# Patient Record
Sex: MALE | Race: WHITE | NOT HISPANIC OR LATINO | URBAN - METROPOLITAN AREA
[De-identification: names, ages, dates, MRNs, and addresses within clinical notes are randomized per-mention and may not be internally consistent; named-entity substitution may affect disease eponyms.]

---

## 2021-02-15 PROBLEM — 13644009 HYPERCHOLESTEROLEMIA: Status: ACTIVE | Noted: 2021-02-15

## 2021-02-15 PROBLEM — 432504007 CEREBRAL INFARCTION: Status: ACTIVE | Noted: 2021-02-15

## 2021-02-15 PROBLEM — 38341003 HYPERTENSION: Status: ACTIVE | Noted: 2021-02-15

## 2021-02-15 PROBLEM — 53741008 CORONARY ARTERY DISEASE: Status: ACTIVE | Noted: 2021-02-15

## 2021-02-15 PROBLEM — 313435000 TYPE I DIABETES MELLITUS WITHOUT COMPLICATION: Status: ACTIVE | Noted: 2021-02-15

## 2021-03-17 ENCOUNTER — OFFICE VISIT (OUTPATIENT)
Dept: URBAN - METROPOLITAN AREA CLINIC 46 | Facility: CLINIC | Age: 66
End: 2021-03-17

## 2021-03-19 ENCOUNTER — OFFICE VISIT (OUTPATIENT)
Dept: URBAN - METROPOLITAN AREA CLINIC 13 | Facility: CLINIC | Age: 66
End: 2021-03-19

## 2021-04-07 ENCOUNTER — OFFICE VISIT (OUTPATIENT)
Dept: URBAN - METROPOLITAN AREA SURGERY CENTER 28 | Facility: SURGERY CENTER | Age: 66
End: 2021-04-07

## 2021-04-07 PROBLEM — 432504007 CEREBRAL INFARCTION: Status: ACTIVE | Noted: 2021-04-07

## 2021-04-07 LAB — PDFREPORT1: (no result)

## 2021-04-14 ENCOUNTER — OFFICE VISIT (OUTPATIENT)
Dept: URBAN - METROPOLITAN AREA CLINIC 13 | Facility: CLINIC | Age: 66
End: 2021-04-14

## 2021-05-20 ENCOUNTER — OFFICE VISIT (OUTPATIENT)
Dept: URBAN - METROPOLITAN AREA CLINIC 46 | Facility: CLINIC | Age: 66
End: 2021-05-20

## 2021-08-23 ENCOUNTER — OFFICE VISIT (OUTPATIENT)
Dept: URBAN - METROPOLITAN AREA CLINIC 46 | Facility: CLINIC | Age: 66
End: 2021-08-23

## 2021-08-23 PROBLEM — 70153002 HEMORRHOIDS: Status: ACTIVE | Noted: 2021-08-23

## 2021-08-23 PROBLEM — 428283002 HISTORY OF POLYP OF COLON: Status: ACTIVE | Noted: 2021-08-23

## 2021-08-23 PROBLEM — 84089009 HIATAL HERNIA: Status: ACTIVE | Noted: 2021-08-23

## 2021-08-28 ENCOUNTER — TELEPHONE ENCOUNTER (OUTPATIENT)
Dept: URBAN - METROPOLITAN AREA CLINIC 13 | Facility: CLINIC | Age: 66
End: 2021-08-28

## 2021-08-28 RX ORDER — LISINOPRIL 5 MG/1
TABLET ORAL
OUTPATIENT
End: 2021-04-07

## 2021-08-29 ENCOUNTER — TELEPHONE ENCOUNTER (OUTPATIENT)
Dept: URBAN - METROPOLITAN AREA CLINIC 13 | Facility: CLINIC | Age: 66
End: 2021-08-29

## 2021-08-29 RX ORDER — CARVEDILOL 6.25 MG/1
TABLET, FILM COATED ORAL
Status: ACTIVE | COMMUNITY

## 2021-08-29 RX ORDER — UBIDECARENONE/VIT E ACET 100MG-5
CAPSULE ORAL
Status: ACTIVE | COMMUNITY

## 2021-08-29 RX ORDER — FERROUS SULFATE 324(65)MG
TABLET, DELAYED RELEASE (ENTERIC COATED) ORAL
Status: ACTIVE | COMMUNITY

## 2021-08-29 RX ORDER — NITROGLYCERIN 0.4 MG/1
TABLET SUBLINGUAL
Status: ACTIVE | COMMUNITY

## 2021-08-29 RX ORDER — PANTOPRAZOLE SODIUM 40 MG/1
TABLET, DELAYED RELEASE ORAL
Status: ACTIVE | COMMUNITY
Start: 2021-03-17

## 2021-08-29 RX ORDER — FLUTICASONE PROPIONATE 50 UG/1
SPRAY, METERED NASAL
Status: ACTIVE | COMMUNITY

## 2021-08-29 RX ORDER — LATANOPROST/PF 0.005 %
DROPS OPHTHALMIC (EYE)
Status: ACTIVE | COMMUNITY

## 2021-08-29 RX ORDER — ASPIRIN 81 MG/1
TABLET ORAL
Status: ACTIVE | COMMUNITY

## 2021-08-29 RX ORDER — TICAGRELOR 90 MG/1
TABLET ORAL
Status: ACTIVE | COMMUNITY

## 2021-08-29 RX ORDER — TAMSULOSIN HYDROCHLORIDE 0.4 MG/1
CAPSULE ORAL
Status: ACTIVE | COMMUNITY

## 2021-08-29 RX ORDER — FLUOXETINE HYDROCHLORIDE 40 MG/1
CAPSULE ORAL
Status: ACTIVE | COMMUNITY

## 2021-08-29 RX ORDER — ATORVASTATIN CALCIUM 80 MG/1
TABLET ORAL
Status: ACTIVE | COMMUNITY

## 2021-08-29 RX ORDER — ELECTROLYTES/DEXTROSE
SOLUTION, ORAL ORAL
Status: ACTIVE | COMMUNITY

## 2021-08-29 RX ORDER — TIMOLOL MALEATE 5 MG/ML
SOLUTION/ DROPS OPHTHALMIC
Status: ACTIVE | COMMUNITY

## 2021-09-13 ENCOUNTER — ERX REFILL RESPONSE (OUTPATIENT)
Dept: URBAN - METROPOLITAN AREA CLINIC 44 | Facility: CLINIC | Age: 66
End: 2021-09-13

## 2021-09-13 RX ORDER — PANTOPRAZOLE SODIUM 40 MG/1
TAKE ONE TABLET BY MOUTH DAILY 30 MINUTES BEFORE BREAKFASTQ TABLET, DELAYED RELEASE ORAL
Qty: 30 TABLET | Refills: 3 | OUTPATIENT

## 2021-09-13 RX ORDER — PANTOPRAZOLE SODIUM 40 MG/1
TABLET, DELAYED RELEASE ORAL
OUTPATIENT

## 2021-09-16 ENCOUNTER — OFFICE VISIT (OUTPATIENT)
Dept: URBAN - METROPOLITAN AREA CLINIC 46 | Facility: CLINIC | Age: 66
End: 2021-09-16
Payer: MEDICARE

## 2021-09-16 VITALS
BODY MASS INDEX: 27.5 KG/M2 | TEMPERATURE: 98.2 F | SYSTOLIC BLOOD PRESSURE: 122 MMHG | DIASTOLIC BLOOD PRESSURE: 67 MMHG | WEIGHT: 203 LBS | HEART RATE: 64 BPM | HEIGHT: 72 IN

## 2021-09-16 DIAGNOSIS — K21.9 GERD WITHOUT ESOPHAGITIS: ICD-10-CM

## 2021-09-16 DIAGNOSIS — R05: ICD-10-CM

## 2021-09-16 PROCEDURE — 99203 OFFICE O/P NEW LOW 30 MIN: CPT | Performed by: INTERNAL MEDICINE

## 2021-09-16 RX ORDER — UBIDECARENONE/VIT E ACET 100MG-5
1 CAPSULE CAPSULE ORAL ONCE A DAY
Status: ACTIVE | COMMUNITY

## 2021-09-16 RX ORDER — LATANOPROST/PF 0.005 %
1 DROP INTO AFFECTED EYE IN THE EVENING DROPS OPHTHALMIC (EYE) ONCE A DAY
Status: ACTIVE | COMMUNITY

## 2021-09-16 RX ORDER — ELECTROLYTES/DEXTROSE
1 TABLET SOLUTION, ORAL ORAL ONCE A DAY
Status: ACTIVE | COMMUNITY

## 2021-09-16 RX ORDER — TICAGRELOR 90 MG/1
1 TABLET TABLET ORAL TWICE A DAY
Status: ACTIVE | COMMUNITY

## 2021-09-16 RX ORDER — ASPIRIN 81 MG/1
1 TABLET TABLET ORAL ONCE A DAY
Status: ACTIVE | COMMUNITY

## 2021-09-16 RX ORDER — TIMOLOL MALEATE 5 MG/ML
1 DROP INTO AFFECTED EYE SOLUTION/ DROPS OPHTHALMIC ONCE A DAY
Status: ACTIVE | COMMUNITY

## 2021-09-16 RX ORDER — ATORVASTATIN CALCIUM 80 MG/1
1 TABLET TABLET ORAL ONCE A DAY
Status: ACTIVE | COMMUNITY

## 2021-09-16 RX ORDER — UMECLIDINIUM BROMIDE AND VILANTEROL TRIFENATATE 62.5; 25 UG/1; UG/1
POWDER RESPIRATORY (INHALATION)
Qty: 60 | Status: ACTIVE | COMMUNITY

## 2021-09-16 RX ORDER — ALBUTEROL SULFATE 90 UG/1
AEROSOL, METERED RESPIRATORY (INHALATION)
Qty: 6.7 | Status: ACTIVE | COMMUNITY

## 2021-09-16 RX ORDER — PANTOPRAZOLE SODIUM 40 MG/1
1 TABLET TABLET, DELAYED RELEASE ORAL ONCE A DAY
Refills: 3 | Status: ACTIVE | COMMUNITY

## 2021-09-16 RX ORDER — TAMSULOSIN HYDROCHLORIDE 0.4 MG/1
1 CAPSULE CAPSULE ORAL ONCE A DAY
Status: ACTIVE | COMMUNITY

## 2021-09-16 RX ORDER — PANTOPRAZOLE SODIUM 40 MG/1
1 TABLET TABLET, DELAYED RELEASE ORAL ONCE A DAY
OUTPATIENT

## 2021-09-16 RX ORDER — PANTOPRAZOLE SODIUM 40 MG/1
1 TABLET TABLET, DELAYED RELEASE ORAL TWICE DAILY
Qty: 60 | Refills: 6 | OUTPATIENT
Start: 2021-09-16

## 2021-09-16 RX ORDER — NITROGLYCERIN 0.4 MG/1
AS DIRECTED TABLET SUBLINGUAL
Status: ACTIVE | COMMUNITY

## 2021-09-16 RX ORDER — CARVEDILOL 6.25 MG/1
1 TABLET WITH FOOD TABLET, FILM COATED ORAL TWICE A DAY
Status: ACTIVE | COMMUNITY

## 2021-09-16 RX ORDER — FLUTICASONE PROPIONATE 50 UG/1
1 SPRAY IN EACH NOSTRIL SPRAY, METERED NASAL ONCE A DAY
Status: ACTIVE | COMMUNITY

## 2021-09-16 RX ORDER — FLUOXETINE HYDROCHLORIDE 40 MG/1
1 CAPSULE CAPSULE ORAL ONCE A DAY
Status: ACTIVE | COMMUNITY

## 2021-09-16 RX ORDER — FERROUS SULFATE 324(65)MG
1 TABLET TABLET, DELAYED RELEASE (ENTERIC COATED) ORAL ONCE A DAY
Status: ACTIVE | COMMUNITY

## 2021-09-16 NOTE — HPI-TODAY'S VISIT:
Sick-year-old male is here for follow-up evaluation of reflux and chronic cough.  He was seen by pulmonary and placed on an inhaler.  Overall the patient states he is 25% better.  Currently he has no abdominal bloating dyspepsia or symptoms of peripheral neuropathy.  Patient currently is taking pantoprazole 40 mg daily.  Patient reports no further dysphagia after his EGD and dilation April 2021.

## 2021-12-16 ENCOUNTER — LAB OUTSIDE AN ENCOUNTER (OUTPATIENT)
Dept: URBAN - METROPOLITAN AREA CLINIC 46 | Facility: CLINIC | Age: 66
End: 2021-12-16

## 2021-12-16 ENCOUNTER — OFFICE VISIT (OUTPATIENT)
Dept: URBAN - METROPOLITAN AREA CLINIC 46 | Facility: CLINIC | Age: 66
End: 2021-12-16
Payer: MEDICARE

## 2021-12-16 VITALS
BODY MASS INDEX: 27.5 KG/M2 | HEIGHT: 72 IN | TEMPERATURE: 98.7 F | DIASTOLIC BLOOD PRESSURE: 72 MMHG | SYSTOLIC BLOOD PRESSURE: 142 MMHG | HEART RATE: 64 BPM | WEIGHT: 203 LBS

## 2021-12-16 DIAGNOSIS — K21.9 GERD WITHOUT ESOPHAGITIS: ICD-10-CM

## 2021-12-16 DIAGNOSIS — R05.8 OTHER SPECIFIED COUGH: ICD-10-CM

## 2021-12-16 PROCEDURE — 99214 OFFICE O/P EST MOD 30 MIN: CPT | Performed by: INTERNAL MEDICINE

## 2021-12-16 RX ORDER — TICAGRELOR 90 MG/1
1 TABLET TABLET ORAL TWICE A DAY
Status: ACTIVE | COMMUNITY

## 2021-12-16 RX ORDER — CARVEDILOL 6.25 MG/1
1 TABLET WITH FOOD TABLET, FILM COATED ORAL TWICE A DAY
Status: ACTIVE | COMMUNITY

## 2021-12-16 RX ORDER — UBIDECARENONE/VIT E ACET 100MG-5
1 CAPSULE CAPSULE ORAL ONCE A DAY
Status: ACTIVE | COMMUNITY

## 2021-12-16 RX ORDER — UMECLIDINIUM BROMIDE AND VILANTEROL TRIFENATATE 62.5; 25 UG/1; UG/1
POWDER RESPIRATORY (INHALATION)
Qty: 60 | Status: ACTIVE | COMMUNITY

## 2021-12-16 RX ORDER — TIMOLOL MALEATE 5 MG/ML
1 DROP INTO AFFECTED EYE SOLUTION/ DROPS OPHTHALMIC ONCE A DAY
Status: ACTIVE | COMMUNITY

## 2021-12-16 RX ORDER — ALBUTEROL SULFATE 90 UG/1
AEROSOL, METERED RESPIRATORY (INHALATION)
Qty: 6.7 | Status: ACTIVE | COMMUNITY

## 2021-12-16 RX ORDER — FLUTICASONE PROPIONATE 50 UG/1
1 SPRAY IN EACH NOSTRIL SPRAY, METERED NASAL ONCE A DAY
Status: ACTIVE | COMMUNITY

## 2021-12-16 RX ORDER — NITROGLYCERIN 0.4 MG/1
AS DIRECTED TABLET SUBLINGUAL
Status: ACTIVE | COMMUNITY

## 2021-12-16 RX ORDER — ELECTROLYTES/DEXTROSE
1 TABLET SOLUTION, ORAL ORAL ONCE A DAY
Status: ACTIVE | COMMUNITY

## 2021-12-16 RX ORDER — TAMSULOSIN HYDROCHLORIDE 0.4 MG/1
1 CAPSULE CAPSULE ORAL ONCE A DAY
Status: ACTIVE | COMMUNITY

## 2021-12-16 RX ORDER — ASPIRIN 81 MG/1
1 TABLET TABLET ORAL ONCE A DAY
Status: ACTIVE | COMMUNITY

## 2021-12-16 RX ORDER — FERROUS SULFATE 324(65)MG
1 TABLET TABLET, DELAYED RELEASE (ENTERIC COATED) ORAL ONCE A DAY
Status: ACTIVE | COMMUNITY

## 2021-12-16 RX ORDER — PANTOPRAZOLE SODIUM 40 MG/1
1 TABLET TABLET, DELAYED RELEASE ORAL TWICE DAILY
Qty: 60 | Refills: 6 | Status: ACTIVE | COMMUNITY
Start: 2021-09-16

## 2021-12-16 RX ORDER — FLUOXETINE HYDROCHLORIDE 40 MG/1
1 CAPSULE CAPSULE ORAL ONCE A DAY
Status: ACTIVE | COMMUNITY

## 2021-12-16 RX ORDER — LATANOPROST/PF 0.005 %
1 DROP INTO AFFECTED EYE IN THE EVENING DROPS OPHTHALMIC (EYE) ONCE A DAY
Status: ACTIVE | COMMUNITY

## 2021-12-16 RX ORDER — ATORVASTATIN CALCIUM 80 MG/1
1 TABLET TABLET ORAL ONCE A DAY
Status: ACTIVE | COMMUNITY

## 2021-12-16 NOTE — HPI-TODAY'S VISIT:
65 yo male with chronic cough is here for follow up.  He continues on pantoprazole 40 mg twice daily and he continues to be symptomatic.  He feels as though his acid reflux medicines are not helping him.  He has no cough in the recumbent position or at nighttime.  Coughing does occur sometimes when he does eat and at different times during the day.  He is currently on several pulmonary medications.  We discussed doing a Bravo test to establish relationship of cough and acid reflux and the patient agrees.  Additionally we discussed changing his pantoprazole to lansoprazole twice daily.

## 2022-01-28 PROBLEM — 266435005: Status: ACTIVE | Noted: 2021-09-16

## 2022-02-14 ENCOUNTER — CLAIMS CREATED FROM THE CLAIM WINDOW (OUTPATIENT)
Dept: URBAN - METROPOLITAN AREA CLINIC 45 | Facility: CLINIC | Age: 67
End: 2022-02-14

## 2022-02-14 ENCOUNTER — OFFICE VISIT (OUTPATIENT)
Dept: URBAN - METROPOLITAN AREA SURGERY CENTER 28 | Facility: SURGERY CENTER | Age: 67
End: 2022-02-14
Payer: MEDICARE

## 2022-02-14 ENCOUNTER — CLAIMS CREATED FROM THE CLAIM WINDOW (OUTPATIENT)
Dept: URBAN - METROPOLITAN AREA CLINIC 45 | Facility: CLINIC | Age: 67
End: 2022-02-14
Payer: MEDICARE

## 2022-02-14 ENCOUNTER — OFFICE VISIT (OUTPATIENT)
Dept: URBAN - METROPOLITAN AREA CLINIC 45 | Facility: CLINIC | Age: 67
End: 2022-02-14

## 2022-02-14 DIAGNOSIS — R05.3 CHRONIC COUGH: ICD-10-CM

## 2022-02-14 DIAGNOSIS — K21.9 ACID REFLUX: ICD-10-CM

## 2022-02-14 DIAGNOSIS — R12 BURNING REFLUX: ICD-10-CM

## 2022-02-14 DIAGNOSIS — K44.9 DIAPHRAGMATIC HERNIA: ICD-10-CM

## 2022-02-14 PROCEDURE — 43235 EGD DIAGNOSTIC BRUSH WASH: CPT | Performed by: INTERNAL MEDICINE

## 2022-02-14 PROCEDURE — 91299 UNLISTED DX GI PROCEDURE: CPT | Performed by: INTERNAL MEDICINE

## 2022-02-14 PROCEDURE — G8907 PT DOC NO EVENTS ON DISCHARG: HCPCS | Performed by: INTERNAL MEDICINE

## 2022-02-14 PROCEDURE — 91035 G-ESOPH REFLX TST W/ELECTROD: CPT | Performed by: INTERNAL MEDICINE

## 2022-02-14 RX ORDER — TICAGRELOR 90 MG/1
1 TABLET TABLET ORAL TWICE A DAY
Status: ACTIVE | COMMUNITY

## 2022-02-14 RX ORDER — ELECTROLYTES/DEXTROSE
1 TABLET SOLUTION, ORAL ORAL ONCE A DAY
Status: ACTIVE | COMMUNITY

## 2022-02-14 RX ORDER — UBIDECARENONE/VIT E ACET 100MG-5
1 CAPSULE CAPSULE ORAL ONCE A DAY
Status: ACTIVE | COMMUNITY

## 2022-02-14 RX ORDER — TAMSULOSIN HYDROCHLORIDE 0.4 MG/1
1 CAPSULE CAPSULE ORAL ONCE A DAY
Status: ACTIVE | COMMUNITY

## 2022-02-14 RX ORDER — UMECLIDINIUM BROMIDE AND VILANTEROL TRIFENATATE 62.5; 25 UG/1; UG/1
POWDER RESPIRATORY (INHALATION)
Qty: 60 | Status: ACTIVE | COMMUNITY

## 2022-02-14 RX ORDER — NITROGLYCERIN 0.4 MG/1
AS DIRECTED TABLET SUBLINGUAL
Status: ACTIVE | COMMUNITY

## 2022-02-14 RX ORDER — ATORVASTATIN CALCIUM 80 MG/1
1 TABLET TABLET ORAL ONCE A DAY
Status: ACTIVE | COMMUNITY

## 2022-02-14 RX ORDER — TIMOLOL MALEATE 5 MG/ML
1 DROP INTO AFFECTED EYE SOLUTION/ DROPS OPHTHALMIC ONCE A DAY
Status: ACTIVE | COMMUNITY

## 2022-02-14 RX ORDER — FLUOXETINE HYDROCHLORIDE 40 MG/1
1 CAPSULE CAPSULE ORAL ONCE A DAY
Status: ACTIVE | COMMUNITY

## 2022-02-14 RX ORDER — LATANOPROST/PF 0.005 %
1 DROP INTO AFFECTED EYE IN THE EVENING DROPS OPHTHALMIC (EYE) ONCE A DAY
Status: ACTIVE | COMMUNITY

## 2022-02-14 RX ORDER — ALBUTEROL SULFATE 90 UG/1
AEROSOL, METERED RESPIRATORY (INHALATION)
Qty: 6.7 | Status: ACTIVE | COMMUNITY

## 2022-02-14 RX ORDER — FLUTICASONE PROPIONATE 50 UG/1
1 SPRAY IN EACH NOSTRIL SPRAY, METERED NASAL ONCE A DAY
Status: ACTIVE | COMMUNITY

## 2022-02-14 RX ORDER — ASPIRIN 81 MG/1
1 TABLET TABLET ORAL ONCE A DAY
Status: ACTIVE | COMMUNITY

## 2022-02-14 RX ORDER — FERROUS SULFATE 324(65)MG
1 TABLET TABLET, DELAYED RELEASE (ENTERIC COATED) ORAL ONCE A DAY
Status: ACTIVE | COMMUNITY

## 2022-02-14 RX ORDER — PANTOPRAZOLE SODIUM 40 MG/1
1 TABLET TABLET, DELAYED RELEASE ORAL TWICE DAILY
Qty: 60 | Refills: 6 | Status: ACTIVE | COMMUNITY
Start: 2021-09-16

## 2022-02-14 RX ORDER — CARVEDILOL 6.25 MG/1
1 TABLET WITH FOOD TABLET, FILM COATED ORAL TWICE A DAY
Status: ACTIVE | COMMUNITY

## 2022-02-21 ENCOUNTER — WEB ENCOUNTER (OUTPATIENT)
Dept: URBAN - METROPOLITAN AREA CLINIC 46 | Facility: CLINIC | Age: 67
End: 2022-02-21

## 2022-02-21 RX ORDER — PANTOPRAZOLE SODIUM 40 MG/1
1 TABLET TABLET, DELAYED RELEASE ORAL TWICE DAILY
Qty: 180 | Refills: 3

## 2022-04-11 ENCOUNTER — OFFICE VISIT (OUTPATIENT)
Dept: URBAN - METROPOLITAN AREA CLINIC 48 | Facility: CLINIC | Age: 67
End: 2022-04-11

## 2022-04-11 RX ORDER — FLUTICASONE PROPIONATE 50 UG/1
1 SPRAY IN EACH NOSTRIL SPRAY, METERED NASAL ONCE A DAY
COMMUNITY

## 2022-04-11 RX ORDER — TIMOLOL MALEATE 5 MG/ML
1 DROP INTO AFFECTED EYE SOLUTION/ DROPS OPHTHALMIC ONCE A DAY
COMMUNITY

## 2022-04-11 RX ORDER — ALBUTEROL SULFATE 90 UG/1
AEROSOL, METERED RESPIRATORY (INHALATION)
Qty: 6.7 | COMMUNITY

## 2022-04-11 RX ORDER — FLUOXETINE HYDROCHLORIDE 40 MG/1
1 CAPSULE CAPSULE ORAL ONCE A DAY
COMMUNITY

## 2022-04-11 RX ORDER — FERROUS SULFATE 324(65)MG
1 TABLET TABLET, DELAYED RELEASE (ENTERIC COATED) ORAL ONCE A DAY
COMMUNITY

## 2022-04-11 RX ORDER — NITROGLYCERIN 0.4 MG/1
AS DIRECTED TABLET SUBLINGUAL
COMMUNITY

## 2022-04-11 RX ORDER — ATORVASTATIN CALCIUM 80 MG/1
1 TABLET TABLET ORAL ONCE A DAY
COMMUNITY

## 2022-04-11 RX ORDER — CARVEDILOL 6.25 MG/1
1 TABLET WITH FOOD TABLET, FILM COATED ORAL TWICE A DAY
COMMUNITY

## 2022-04-11 RX ORDER — UBIDECARENONE/VIT E ACET 100MG-5
1 CAPSULE CAPSULE ORAL ONCE A DAY
COMMUNITY

## 2022-04-11 RX ORDER — TAMSULOSIN HYDROCHLORIDE 0.4 MG/1
1 CAPSULE CAPSULE ORAL ONCE A DAY
COMMUNITY

## 2022-04-11 RX ORDER — LATANOPROST/PF 0.005 %
1 DROP INTO AFFECTED EYE IN THE EVENING DROPS OPHTHALMIC (EYE) ONCE A DAY
COMMUNITY

## 2022-04-11 RX ORDER — ASPIRIN 81 MG/1
1 TABLET TABLET ORAL ONCE A DAY
COMMUNITY

## 2022-04-11 RX ORDER — UMECLIDINIUM BROMIDE AND VILANTEROL TRIFENATATE 62.5; 25 UG/1; UG/1
POWDER RESPIRATORY (INHALATION)
Qty: 60 | COMMUNITY

## 2022-04-11 RX ORDER — ELECTROLYTES/DEXTROSE
1 TABLET SOLUTION, ORAL ORAL ONCE A DAY
COMMUNITY

## 2022-04-11 RX ORDER — TICAGRELOR 90 MG/1
1 TABLET TABLET ORAL TWICE A DAY
COMMUNITY

## 2022-04-11 RX ORDER — PANTOPRAZOLE SODIUM 40 MG/1
1 TABLET TABLET, DELAYED RELEASE ORAL TWICE DAILY
Qty: 180 | Refills: 3 | COMMUNITY

## 2022-05-03 ENCOUNTER — DASHBOARD ENCOUNTERS (OUTPATIENT)
Age: 67
End: 2022-05-03

## 2022-05-03 ENCOUNTER — OFFICE VISIT (OUTPATIENT)
Dept: URBAN - METROPOLITAN AREA CLINIC 48 | Facility: CLINIC | Age: 67
End: 2022-05-03
Payer: MEDICARE

## 2022-05-03 VITALS
SYSTOLIC BLOOD PRESSURE: 122 MMHG | OXYGEN SATURATION: 95 % | BODY MASS INDEX: 28.2 KG/M2 | HEIGHT: 72 IN | WEIGHT: 208.2 LBS | HEART RATE: 69 BPM | DIASTOLIC BLOOD PRESSURE: 68 MMHG | TEMPERATURE: 97.7 F

## 2022-05-03 DIAGNOSIS — Z86.010 ADENOMAS PERSONAL HISTORY OF COLONIC POLYPS: ICD-10-CM

## 2022-05-03 DIAGNOSIS — K21.9 ACID REFLUX: ICD-10-CM

## 2022-05-03 PROCEDURE — 99213 OFFICE O/P EST LOW 20 MIN: CPT | Performed by: PHYSICIAN ASSISTANT

## 2022-05-03 RX ORDER — FERROUS SULFATE 324(65)MG
1 TABLET TABLET, DELAYED RELEASE (ENTERIC COATED) ORAL ONCE A DAY
Status: ACTIVE | COMMUNITY

## 2022-05-03 RX ORDER — FLUOXETINE HYDROCHLORIDE 40 MG/1
1 CAPSULE CAPSULE ORAL ONCE A DAY
Status: ACTIVE | COMMUNITY

## 2022-05-03 RX ORDER — CARVEDILOL 6.25 MG/1
1 TABLET WITH FOOD TABLET, FILM COATED ORAL TWICE A DAY
Status: ACTIVE | COMMUNITY

## 2022-05-03 RX ORDER — ASPIRIN 81 MG/1
1 TABLET TABLET ORAL ONCE A DAY
Status: ACTIVE | COMMUNITY

## 2022-05-03 RX ORDER — UBIDECARENONE/VIT E ACET 100MG-5
1 CAPSULE CAPSULE ORAL ONCE A DAY
Status: ACTIVE | COMMUNITY

## 2022-05-03 RX ORDER — UMECLIDINIUM BROMIDE AND VILANTEROL TRIFENATATE 62.5; 25 UG/1; UG/1
POWDER RESPIRATORY (INHALATION)
Qty: 60 | Status: ACTIVE | COMMUNITY

## 2022-05-03 RX ORDER — ELECTROLYTES/DEXTROSE
1 TABLET SOLUTION, ORAL ORAL ONCE A DAY
Status: ACTIVE | COMMUNITY

## 2022-05-03 RX ORDER — FLUTICASONE PROPIONATE 50 UG/1
1 SPRAY IN EACH NOSTRIL SPRAY, METERED NASAL ONCE A DAY
Status: ACTIVE | COMMUNITY

## 2022-05-03 RX ORDER — ALBUTEROL SULFATE 90 UG/1
AEROSOL, METERED RESPIRATORY (INHALATION)
Qty: 6.7 | Status: ACTIVE | COMMUNITY

## 2022-05-03 RX ORDER — NITROGLYCERIN 0.4 MG/1
AS DIRECTED TABLET SUBLINGUAL
Status: ACTIVE | COMMUNITY

## 2022-05-03 RX ORDER — PANTOPRAZOLE SODIUM 40 MG/1
1 TABLET TABLET, DELAYED RELEASE ORAL TWICE DAILY
Qty: 180 | Refills: 3 | Status: ACTIVE | COMMUNITY

## 2022-05-03 RX ORDER — TIMOLOL MALEATE 5 MG/ML
1 DROP INTO AFFECTED EYE SOLUTION/ DROPS OPHTHALMIC ONCE A DAY
Status: ACTIVE | COMMUNITY

## 2022-05-03 RX ORDER — TICAGRELOR 90 MG/1
1 TABLET TABLET ORAL TWICE A DAY
Status: ACTIVE | COMMUNITY

## 2022-05-03 RX ORDER — LATANOPROST/PF 0.005 %
1 DROP INTO AFFECTED EYE IN THE EVENING DROPS OPHTHALMIC (EYE) ONCE A DAY
Status: ACTIVE | COMMUNITY

## 2022-05-03 RX ORDER — TAMSULOSIN HYDROCHLORIDE 0.4 MG/1
1 CAPSULE CAPSULE ORAL ONCE A DAY
Status: ACTIVE | COMMUNITY

## 2022-05-03 RX ORDER — ATORVASTATIN CALCIUM 80 MG/1
1 TABLET TABLET ORAL ONCE A DAY
Status: ACTIVE | COMMUNITY

## 2022-05-03 NOTE — HPI-TODAY'S VISIT:
65 yo male with chronic cough is here for follow up.  He was seen in December and taking Pantoprazole 40 mg twice daily with continued symptoms.  He denied cough in the recumbent position or at nighttime.  Coughing does occur sometimes when he does eat and at different times during the day.  He is currently on several pulmonary medications. Changing his pantoprazole to lansoprazole twice daily was also discussed at prior OV.  EGD with Bravo test was done to establish relationship of cough and acid reflux in February which showed a 1 cm hiatal hernia. Bravo showed normal total acid exposure time of 1.9%, normal DeMeeseter score of 5.7, and no correlation with cough and reflux. Patient states he is off of PPI therapy at this time. He continues to cough some each day which is irritating. He is ia poor historian. He cannot recall who his pulmonologist is, but has not seen them recently. He still takes a daily inhaler (Enora Ellipta); denies diagnosis of asthma or COPD. He states he has also seen ENT in the past, and usues a nasal spray. He has not tried a daily allergy medication. He has h/o colon polyps and due for surveillance 4/2024.

## 2022-12-15 PROBLEM — 698065002 ACID REFLUX: Status: ACTIVE | Noted: 2022-12-15

## 2022-12-15 PROBLEM — 49727002 COUGH: Status: ACTIVE | Noted: 2021-12-16

## 2022-12-15 PROBLEM — 428283002 HISTORY OF POLYP OF COLON: Status: ACTIVE | Noted: 2022-12-15

## 2023-04-05 ENCOUNTER — ERX REFILL RESPONSE (OUTPATIENT)
Dept: URBAN - METROPOLITAN AREA CLINIC 46 | Facility: CLINIC | Age: 68
End: 2023-04-05

## 2023-04-05 RX ORDER — PANTOPRAZOLE SODIUM 40 MG/1
1 TABLET TABLET, DELAYED RELEASE ORAL TWICE DAILY
Qty: 180 | Refills: 3 | OUTPATIENT

## 2023-04-05 RX ORDER — PANTOPRAZOLE 40 MG/1
TAKE ONE TABLET BY MOUTH TWICE A DAY TABLET, DELAYED RELEASE ORAL
Qty: 180 TABLET | Refills: 3 | OUTPATIENT

## 2024-10-24 ENCOUNTER — OFFICE VISIT (OUTPATIENT)
Dept: URBAN - METROPOLITAN AREA CLINIC 46 | Facility: CLINIC | Age: 69
End: 2024-10-24

## 2024-12-26 ENCOUNTER — OFFICE VISIT (OUTPATIENT)
Dept: URBAN - METROPOLITAN AREA CLINIC 48 | Facility: CLINIC | Age: 69
End: 2024-12-26

## 2024-12-26 RX ORDER — TIMOLOL MALEATE 5 MG/ML
1 DROP INTO AFFECTED EYE SOLUTION/ DROPS OPHTHALMIC ONCE A DAY
Status: ACTIVE | COMMUNITY

## 2024-12-26 RX ORDER — FLUTICASONE PROPIONATE 50 UG/1
1 SPRAY IN EACH NOSTRIL SPRAY, METERED NASAL ONCE A DAY
Status: ACTIVE | COMMUNITY

## 2024-12-26 RX ORDER — PANTOPRAZOLE 40 MG/1
TAKE ONE TABLET BY MOUTH TWICE A DAY TABLET, DELAYED RELEASE ORAL
Qty: 180 TABLET | Refills: 3 | Status: ACTIVE | COMMUNITY

## 2024-12-26 RX ORDER — ASPIRIN 81 MG/1
1 TABLET TABLET ORAL ONCE A DAY
Status: ACTIVE | COMMUNITY

## 2024-12-26 RX ORDER — UBIDECARENONE/VIT E ACET 100MG-5
1 CAPSULE CAPSULE ORAL ONCE A DAY
Status: ACTIVE | COMMUNITY

## 2024-12-26 RX ORDER — LATANOPROST/PF 0.005 %
1 DROP INTO AFFECTED EYE IN THE EVENING DROPS OPHTHALMIC (EYE) ONCE A DAY
Status: ACTIVE | COMMUNITY

## 2024-12-26 RX ORDER — FERROUS SULFATE 324(65)MG
1 TABLET TABLET, DELAYED RELEASE (ENTERIC COATED) ORAL ONCE A DAY
Status: ACTIVE | COMMUNITY

## 2024-12-26 RX ORDER — TICAGRELOR 90 MG/1
1 TABLET TABLET ORAL TWICE A DAY
Status: ACTIVE | COMMUNITY

## 2024-12-26 RX ORDER — TAMSULOSIN HYDROCHLORIDE 0.4 MG/1
1 CAPSULE CAPSULE ORAL ONCE A DAY
Status: ACTIVE | COMMUNITY

## 2024-12-26 RX ORDER — CARVEDILOL 6.25 MG/1
1 TABLET WITH FOOD TABLET, FILM COATED ORAL TWICE A DAY
Status: ACTIVE | COMMUNITY

## 2024-12-26 RX ORDER — ATORVASTATIN CALCIUM 80 MG/1
1 TABLET TABLET ORAL ONCE A DAY
Status: ACTIVE | COMMUNITY

## 2024-12-26 RX ORDER — FLUOXETINE HYDROCHLORIDE 40 MG/1
1 CAPSULE CAPSULE ORAL ONCE A DAY
Status: ACTIVE | COMMUNITY

## 2024-12-26 RX ORDER — ELECTROLYTES/DEXTROSE
1 TABLET SOLUTION, ORAL ORAL ONCE A DAY
Status: ACTIVE | COMMUNITY

## 2024-12-26 RX ORDER — NITROGLYCERIN 0.4 MG/1
AS DIRECTED TABLET SUBLINGUAL
Status: ACTIVE | COMMUNITY

## 2024-12-26 RX ORDER — ALBUTEROL SULFATE 90 UG/1
AEROSOL, METERED RESPIRATORY (INHALATION)
Qty: 6.7 | Status: ACTIVE | COMMUNITY

## 2024-12-26 RX ORDER — UMECLIDINIUM BROMIDE AND VILANTEROL TRIFENATATE 62.5; 25 UG/1; UG/1
POWDER RESPIRATORY (INHALATION)
Qty: 60 | Status: ACTIVE | COMMUNITY

## 2024-12-26 NOTE — HPI-TODAY'S VISIT:
69-year-old male presents for follow-up per PCP.  Last OV 5/3/2022 for chronic cough.  Taking Protonix 40 mg twice daily with continued symptoms.  Coughing sometimes occurs after meals and at different times during the day, but patient denied cough at nighttime.  On several pulmonary medications.  EGD with Bravo  2/2024 showed 1 cm hiatal hernia.  Bravo with normal DeMeester score of 5.7, and no correlation with cough and reflux.  At last visit, patient was off PPI therapy, and continued to cough some each day.  He had not seen pulmonology recently.  He has seen ENT in the past, and uses nasal spray.  Is also on daily inhaler.  Advised trial of daily Claritin and nightly nasal saline lavages.  Follow-up with pulmonary for reevaluation.  If persistent symptoms, could consider treatment for possible hyper esophageal sensitivity. Patient has history colon polyps, and is due for surveillance 4/2024.

## 2025-05-22 ENCOUNTER — OFFICE VISIT (OUTPATIENT)
Dept: URBAN - METROPOLITAN AREA CLINIC 46 | Facility: CLINIC | Age: 70
End: 2025-05-22

## 2025-05-22 ENCOUNTER — OFFICE VISIT (OUTPATIENT)
Dept: URBAN - METROPOLITAN AREA CLINIC 46 | Facility: CLINIC | Age: 70
End: 2025-05-22
Payer: MEDICARE

## 2025-05-22 DIAGNOSIS — R53.1 LEFT-SIDED WEAKNESS: ICD-10-CM

## 2025-05-22 DIAGNOSIS — R63.30 FEEDING DIFFICULTIES: ICD-10-CM

## 2025-05-22 PROCEDURE — 99213 OFFICE O/P EST LOW 20 MIN: CPT | Performed by: INTERNAL MEDICINE

## 2025-05-22 RX ORDER — OMEPRAZOLE 20 MG/1
1 CAPSULE 1/2 TO 1 HOUR BEFORE MORNING MEAL CAPSULE, DELAYED RELEASE ORAL ONCE A DAY
Status: ACTIVE | COMMUNITY

## 2025-05-22 RX ORDER — CARVEDILOL 12.5 MG/1
1 TABLET WITH FOOD TABLET, FILM COATED ORAL TWICE A DAY
Status: ACTIVE | COMMUNITY

## 2025-05-22 RX ORDER — ELECTROLYTES/DEXTROSE
1 TABLET SOLUTION, ORAL ORAL ONCE A DAY
Status: ACTIVE | COMMUNITY

## 2025-05-22 RX ORDER — UBIDECARENONE/VIT E ACET 100MG-5
1 CAPSULE CAPSULE ORAL ONCE A DAY
Status: ACTIVE | COMMUNITY

## 2025-05-22 RX ORDER — ASPIRIN 81 MG/1
1 TABLET TABLET, COATED ORAL ONCE A DAY
Status: ACTIVE | COMMUNITY

## 2025-05-22 RX ORDER — BRIMONIDINE TARTRATE AND TIMOLOL MALEATE 2; 5 MG/ML; MG/ML
1 DROP INTO AFFECTED EYE SOLUTION/ DROPS OPHTHALMIC TWICE A DAY
Status: ACTIVE | COMMUNITY

## 2025-05-22 RX ORDER — AZELASTINE HYDROCHLORIDE 137 UG/1
2 PUFFS (1 SPRAY IN EACH NOSTRIL) SPRAY, METERED NASAL TWICE A DAY
Status: ACTIVE | COMMUNITY

## 2025-05-22 RX ORDER — AMLODIPINE BESYLATE 10 MG/1
1 TABLET TABLET ORAL ONCE A DAY
Qty: 30 TABLET | Status: ACTIVE | COMMUNITY

## 2025-05-22 RX ORDER — NITROGLYCERIN 0.4 MG/1
AS DIRECTED TABLET SUBLINGUAL
COMMUNITY

## 2025-05-22 RX ORDER — METOCLOPRAMIDE 10 MG/1
1 TABLET BEFORE MEALS TABLET ORAL DAILY
Status: ACTIVE | COMMUNITY

## 2025-05-22 RX ORDER — INSULIN LISPRO 100 [IU]/ML
AS DIRECTED INJECTION, SOLUTION INTRAVENOUS; SUBCUTANEOUS DAILY
Status: ACTIVE | COMMUNITY

## 2025-05-22 RX ORDER — CARVEDILOL 12.5 MG/1
1 TABLET WITH FOOD TABLET, FILM COATED ORAL TWICE A DAY
Qty: 30 TABLET | Status: ACTIVE | COMMUNITY

## 2025-05-22 RX ORDER — UBIDECARENONE/VIT E ACET 100MG-5
1 CAPSULE CAPSULE ORAL ONCE A DAY
COMMUNITY

## 2025-05-22 RX ORDER — ELECTROLYTES/DEXTROSE
1 TABLET SOLUTION, ORAL ORAL ONCE A DAY
COMMUNITY

## 2025-05-22 RX ORDER — UMECLIDINIUM BROMIDE AND VILANTEROL TRIFENATATE 62.5; 25 UG/1; UG/1
POWDER RESPIRATORY (INHALATION)
Qty: 60 | Status: ACTIVE | COMMUNITY

## 2025-05-22 RX ORDER — TIMOLOL MALEATE 5 MG/ML
1 DROP INTO AFFECTED EYE SOLUTION/ DROPS OPHTHALMIC ONCE A DAY
Status: ACTIVE | COMMUNITY

## 2025-05-22 RX ORDER — AMLODIPINE BESYLATE 10 MG/1
1 TABLET TABLET ORAL ONCE A DAY
Status: ACTIVE | COMMUNITY

## 2025-05-22 RX ORDER — TAMSULOSIN HYDROCHLORIDE 0.4 MG/1
1 CAPSULE CAPSULE ORAL ONCE A DAY
Qty: 15 CAPSULE | Status: ACTIVE | COMMUNITY

## 2025-05-22 RX ORDER — FERROUS SULFATE 324(65)MG
1 TABLET TABLET, DELAYED RELEASE (ENTERIC COATED) ORAL ONCE A DAY
COMMUNITY

## 2025-05-22 RX ORDER — FLUTICASONE PROPIONATE 50 UG/1
1 SPRAY IN EACH NOSTRIL SPRAY, METERED NASAL ONCE A DAY
Status: ACTIVE | COMMUNITY

## 2025-05-22 RX ORDER — OXYCODONE HYDROCHLORIDE AND ACETAMINOPHEN 5; 325 MG/1; MG/1
1 TABLET AS NEEDED TABLET ORAL
Status: ACTIVE | COMMUNITY

## 2025-05-22 RX ORDER — TRAZODONE HYDROCHLORIDE 50 MG/1
1 TABLET AT BEDTIME AS NEEDED TABLET ORAL ONCE A DAY
Status: ACTIVE | COMMUNITY

## 2025-05-22 RX ORDER — GABAPENTIN 300 MG/1
1 CAPSULE CAPSULE ORAL ONCE A DAY
Qty: 30 CAPSULE | Status: ACTIVE | COMMUNITY

## 2025-05-22 RX ORDER — BENZONATATE 100 MG/1
1 CAPSULE AS NEEDED CAPSULE ORAL
Status: ACTIVE | COMMUNITY

## 2025-05-22 RX ORDER — INSULIN LISPRO 100 U/ML
INJECT 12 UNITS BEFORE MEALS INJECTION, SOLUTION SUBCUTANEOUS
Status: ACTIVE | COMMUNITY

## 2025-05-22 RX ORDER — UMECLIDINIUM BROMIDE AND VILANTEROL TRIFENATATE 62.5; 25 UG/1; UG/1
POWDER RESPIRATORY (INHALATION)
Qty: 60 EACH | Status: ACTIVE | COMMUNITY

## 2025-05-22 RX ORDER — ATORVASTATIN CALCIUM 80 MG/1
1 TABLET TABLET, FILM COATED ORAL ONCE A DAY
Qty: 30 TABLET | Status: ACTIVE | COMMUNITY

## 2025-05-22 RX ORDER — INSULIN GLARGINE 100 [IU]/ML
AS DIRECTED INJECTION, SOLUTION SUBCUTANEOUS DAILY
Status: ACTIVE | COMMUNITY

## 2025-05-22 RX ORDER — LATANOPROST/PF 0.005 %
1 DROP INTO AFFECTED EYE IN THE EVENING DROPS OPHTHALMIC (EYE) ONCE A DAY
COMMUNITY

## 2025-05-22 RX ORDER — APIXABAN 5 MG/1
AS DIRECTED TABLET, FILM COATED ORAL DAILY
Status: ACTIVE | COMMUNITY

## 2025-05-22 RX ORDER — ATORVASTATIN CALCIUM 80 MG/1
1 TABLET TABLET ORAL ONCE A DAY
Status: ACTIVE | COMMUNITY

## 2025-05-22 RX ORDER — FERROUS SULFATE 324(65)MG
1 TABLET TABLET, DELAYED RELEASE (ENTERIC COATED) ORAL ONCE A DAY
Status: ACTIVE | COMMUNITY

## 2025-05-22 RX ORDER — GABAPENTIN 300 MG/1
1 CAPSULE CAPSULE ORAL
Status: ACTIVE | COMMUNITY

## 2025-05-22 RX ORDER — TIMOLOL MALEATE 5 MG/ML
1 DROP INTO AFFECTED EYE SOLUTION/ DROPS OPHTHALMIC ONCE A DAY
COMMUNITY

## 2025-05-22 RX ORDER — FLUTICASONE PROPIONATE 50 UG/1
1 SPRAY IN EACH NOSTRIL SPRAY, METERED NASAL TWICE A DAY
Status: ACTIVE | COMMUNITY

## 2025-05-22 RX ORDER — APIXABAN 5 MG/1
AS DIRECTED TABLET, FILM COATED ORAL TWICE A DAY
Status: ACTIVE | COMMUNITY

## 2025-05-22 RX ORDER — TAMSULOSIN HYDROCHLORIDE 0.4 MG/1
1 CAPSULE CAPSULE ORAL ONCE A DAY
Status: ACTIVE | COMMUNITY

## 2025-05-22 RX ORDER — METOCLOPRAMIDE 10 MG/1
1 TABLET BEFORE MEALS TABLET ORAL TWICE A DAY
Qty: 28 TABLET | Status: ACTIVE | COMMUNITY

## 2025-05-22 RX ORDER — INSULIN GLARGINE 100 [IU]/ML
18 UNITS TWO TIMES A DAY INJECTION, SOLUTION SUBCUTANEOUS
Status: ACTIVE | COMMUNITY

## 2025-05-22 RX ORDER — TRAZODONE HYDROCHLORIDE 50 MG/1
1 TABLET AT BEDTIME AS NEEDED TABLET ORAL ONCE A DAY
Qty: 15 TABLET | Status: ACTIVE | COMMUNITY

## 2025-05-22 NOTE — HPI-TODAY'S VISIT:
69-year-old male presents for follow-up dysphagia and removal of peg tube (He has the original. Mushroom catheter tube) placed at [facility] 11/2024. He had a stroke in October and has been in a nursing home. He is on stretcher brought by [facility] and unable to move his left side. His wife did come for visit today. He did eat breakfast this morning. He and his wife state the tube has not been used in over a month. He states it bothers him, and he would like it removed. He is on Eliquis and aspirin which have not been held.    Last OV 5/3/2022 for chronic cough.  Taking Omeprazole 20 mg daily.  Coughing sometimes occurs after meals and at different times during the day, but patient denied cough at nighttime.  On several pulmonary medications.  EGD with Bravo  2/2024 showed 1 cm hiatal hernia.  Bravo with normal DeMeester score of 5.7, and no correlation with cough and reflux. He has seen ENT in the past, and uses nasal spray.  Is also on daily inhaler.  Advised trial of daily Claritin and nightly nasal saline lavages.  Patient has history colon polyps  Patient has a history of stroke in 2024, 2018 and 2024, coronary artery disease, chronic dysphagia, and GERD.

## 2025-05-22 NOTE — PHYSICAL EXAM GASTROINTESTINAL
Thin with good bowel sounds. G tube sight looks clean no sign of infection. Initial tube with no balloon port seen.

## 2025-05-29 ENCOUNTER — TELEPHONE ENCOUNTER (OUTPATIENT)
Dept: URBAN - METROPOLITAN AREA SURGERY CENTER 28 | Facility: SURGERY CENTER | Age: 70
End: 2025-05-29

## 2025-06-24 ENCOUNTER — OFFICE VISIT (OUTPATIENT)
Dept: URBAN - METROPOLITAN AREA CLINIC 44 | Facility: CLINIC | Age: 70
End: 2025-06-24
Payer: COMMERCIAL

## 2025-06-24 DIAGNOSIS — R05.3 CHRONIC COUGH: ICD-10-CM

## 2025-06-24 DIAGNOSIS — Z86.0100 HISTORY OF COLON POLYPS: ICD-10-CM

## 2025-06-24 DIAGNOSIS — Z93.1 PRESENCE OF EXTERNALLY REMOVABLE PERCUTANEOUS ENDOSCOPIC GASTROSTOMY (PEG) TUBE: ICD-10-CM

## 2025-06-24 PROBLEM — 413235002: Status: ACTIVE | Noted: 2025-06-24

## 2025-06-24 PROCEDURE — 99214 OFFICE O/P EST MOD 30 MIN: CPT | Performed by: INTERNAL MEDICINE

## 2025-06-24 RX ORDER — METOCLOPRAMIDE 10 MG/1
1 TABLET BEFORE MEALS TABLET ORAL DAILY
Status: ACTIVE | COMMUNITY

## 2025-06-24 RX ORDER — TAMSULOSIN HYDROCHLORIDE 0.4 MG/1
1 CAPSULE CAPSULE ORAL ONCE A DAY
Qty: 15 CAPSULE | Status: ACTIVE | COMMUNITY

## 2025-06-24 RX ORDER — GABAPENTIN 300 MG/1
1 CAPSULE CAPSULE ORAL
Status: ACTIVE | COMMUNITY

## 2025-06-24 RX ORDER — CARVEDILOL 12.5 MG/1
1 TABLET WITH FOOD TABLET, FILM COATED ORAL TWICE A DAY
Qty: 30 TABLET | Status: ACTIVE | COMMUNITY

## 2025-06-24 RX ORDER — ATORVASTATIN CALCIUM 80 MG/1
1 TABLET TABLET, FILM COATED ORAL ONCE A DAY
Qty: 30 TABLET | Status: ACTIVE | COMMUNITY

## 2025-06-24 RX ORDER — CARVEDILOL 6.25 MG/1
1 TABLET WITH FOOD TABLET, FILM COATED ORAL TWICE A DAY
Status: ACTIVE | COMMUNITY

## 2025-06-24 RX ORDER — LATANOPROST/PF 0.005 %
1 DROP INTO AFFECTED EYE IN THE EVENING DROPS OPHTHALMIC (EYE) ONCE A DAY
COMMUNITY

## 2025-06-24 RX ORDER — AZELASTINE HYDROCHLORIDE 137 UG/1
2 PUFFS (1 SPRAY IN EACH NOSTRIL) SPRAY, METERED NASAL TWICE A DAY
Status: ACTIVE | COMMUNITY

## 2025-06-24 RX ORDER — TIMOLOL MALEATE 5 MG/ML
1 DROP INTO AFFECTED EYE SOLUTION/ DROPS OPHTHALMIC ONCE A DAY
COMMUNITY

## 2025-06-24 RX ORDER — ASPIRIN 81 MG/1
1 TABLET TABLET ORAL ONCE A DAY
Status: ACTIVE | COMMUNITY

## 2025-06-24 RX ORDER — TRAZODONE HYDROCHLORIDE 50 MG/1
1 TABLET AT BEDTIME AS NEEDED TABLET ORAL ONCE A DAY
Status: ACTIVE | COMMUNITY

## 2025-06-24 RX ORDER — TAMSULOSIN HYDROCHLORIDE 0.4 MG/1
1 CAPSULE CAPSULE ORAL ONCE A DAY
Status: ACTIVE | COMMUNITY

## 2025-06-24 RX ORDER — ALBUTEROL SULFATE 90 UG/1
AEROSOL, METERED RESPIRATORY (INHALATION)
Qty: 6.7 | Status: ACTIVE | COMMUNITY

## 2025-06-24 RX ORDER — OXYCODONE HYDROCHLORIDE AND ACETAMINOPHEN 5; 325 MG/1; MG/1
1 TABLET AS NEEDED TABLET ORAL
Status: ACTIVE | COMMUNITY

## 2025-06-24 RX ORDER — APIXABAN 5 MG/1
AS DIRECTED TABLET, FILM COATED ORAL TWICE A DAY
Status: ACTIVE | COMMUNITY

## 2025-06-24 RX ORDER — ASPIRIN 81 MG/1
1 TABLET TABLET, COATED ORAL ONCE A DAY
Status: ACTIVE | COMMUNITY

## 2025-06-24 RX ORDER — INSULIN GLARGINE 100 [IU]/ML
AS DIRECTED INJECTION, SOLUTION SUBCUTANEOUS DAILY
Status: ACTIVE | COMMUNITY

## 2025-06-24 RX ORDER — AMLODIPINE BESYLATE 10 MG/1
1 TABLET TABLET ORAL ONCE A DAY
Status: ACTIVE | COMMUNITY

## 2025-06-24 RX ORDER — UBIDECARENONE/VIT E ACET 100MG-5
1 CAPSULE CAPSULE ORAL ONCE A DAY
COMMUNITY

## 2025-06-24 RX ORDER — OMEPRAZOLE 20 MG/1
1 CAPSULE 1/2 TO 1 HOUR BEFORE MORNING MEAL CAPSULE, DELAYED RELEASE ORAL ONCE A DAY
Status: ACTIVE | COMMUNITY

## 2025-06-24 RX ORDER — FERROUS SULFATE 324(65)MG
1 TABLET TABLET, DELAYED RELEASE (ENTERIC COATED) ORAL ONCE A DAY
COMMUNITY

## 2025-06-24 RX ORDER — FLUTICASONE PROPIONATE 50 UG/1
1 SPRAY IN EACH NOSTRIL SPRAY, METERED NASAL TWICE A DAY
Status: ACTIVE | COMMUNITY

## 2025-06-24 RX ORDER — PANTOPRAZOLE 40 MG/1
TAKE ONE TABLET BY MOUTH TWICE A DAY TABLET, DELAYED RELEASE ORAL
Qty: 180 TABLET | Refills: 3 | Status: ACTIVE | COMMUNITY

## 2025-06-24 RX ORDER — METOCLOPRAMIDE 10 MG/1
1 TABLET BEFORE MEALS TABLET ORAL TWICE A DAY
Qty: 28 TABLET | Status: ACTIVE | COMMUNITY

## 2025-06-24 RX ORDER — INSULIN GLARGINE 100 [IU]/ML
18 UNITS TWO TIMES A DAY INJECTION, SOLUTION SUBCUTANEOUS
Status: ACTIVE | COMMUNITY

## 2025-06-24 RX ORDER — BRIMONIDINE TARTRATE AND TIMOLOL MALEATE 2; 5 MG/ML; MG/ML
1 DROP INTO AFFECTED EYE SOLUTION/ DROPS OPHTHALMIC TWICE A DAY
Status: ACTIVE | COMMUNITY

## 2025-06-24 RX ORDER — INSULIN LISPRO 100 U/ML
INJECT 12 UNITS BEFORE MEALS INJECTION, SOLUTION SUBCUTANEOUS
Status: ACTIVE | COMMUNITY

## 2025-06-24 RX ORDER — FLUTICASONE PROPIONATE 50 UG/1
1 SPRAY IN EACH NOSTRIL SPRAY, METERED NASAL ONCE A DAY
Status: ACTIVE | COMMUNITY

## 2025-06-24 RX ORDER — CARVEDILOL 12.5 MG/1
1 TABLET WITH FOOD TABLET, FILM COATED ORAL TWICE A DAY
Status: ACTIVE | COMMUNITY

## 2025-06-24 RX ORDER — GABAPENTIN 300 MG/1
1 CAPSULE CAPSULE ORAL ONCE A DAY
Qty: 30 CAPSULE | Status: ACTIVE | COMMUNITY

## 2025-06-24 RX ORDER — APIXABAN 5 MG/1
AS DIRECTED TABLET, FILM COATED ORAL DAILY
Status: ACTIVE | COMMUNITY

## 2025-06-24 RX ORDER — TICAGRELOR 90 MG/1
1 TABLET TABLET ORAL TWICE A DAY
Status: ACTIVE | COMMUNITY

## 2025-06-24 RX ORDER — AMLODIPINE BESYLATE 10 MG/1
1 TABLET TABLET ORAL ONCE A DAY
Qty: 30 TABLET | Status: ACTIVE | COMMUNITY

## 2025-06-24 RX ORDER — UMECLIDINIUM BROMIDE AND VILANTEROL TRIFENATATE 62.5; 25 UG/1; UG/1
POWDER RESPIRATORY (INHALATION)
Qty: 60 | Status: ACTIVE | COMMUNITY

## 2025-06-24 RX ORDER — INSULIN LISPRO 100 [IU]/ML
AS DIRECTED INJECTION, SOLUTION INTRAVENOUS; SUBCUTANEOUS DAILY
Status: ACTIVE | COMMUNITY

## 2025-06-24 RX ORDER — UMECLIDINIUM BROMIDE AND VILANTEROL TRIFENATATE 62.5; 25 UG/1; UG/1
POWDER RESPIRATORY (INHALATION)
Qty: 60 EACH | Status: ACTIVE | COMMUNITY

## 2025-06-24 RX ORDER — TRAZODONE HYDROCHLORIDE 50 MG/1
1 TABLET AT BEDTIME AS NEEDED TABLET ORAL ONCE A DAY
Qty: 15 TABLET | Status: ACTIVE | COMMUNITY

## 2025-06-24 RX ORDER — ATORVASTATIN CALCIUM 80 MG/1
1 TABLET TABLET ORAL ONCE A DAY
Status: ACTIVE | COMMUNITY

## 2025-06-24 RX ORDER — ELECTROLYTES/DEXTROSE
1 TABLET SOLUTION, ORAL ORAL ONCE A DAY
COMMUNITY

## 2025-06-24 RX ORDER — BENZONATATE 100 MG/1
1 CAPSULE AS NEEDED CAPSULE ORAL
Status: ACTIVE | COMMUNITY

## 2025-06-24 RX ORDER — NITROGLYCERIN 0.4 MG/1
AS DIRECTED TABLET SUBLINGUAL
COMMUNITY

## 2025-06-24 RX ORDER — FLUOXETINE HYDROCHLORIDE 40 MG/1
1 CAPSULE CAPSULE ORAL ONCE A DAY
Status: ACTIVE | COMMUNITY

## 2025-06-24 NOTE — PHYSICAL EXAM CONSTITUTIONAL:
well developed, well nourished , in no acute distress, on stretcher not janina to ambulate , normal communication ability

## 2025-06-24 NOTE — PHYSICAL EXAM GASTROINTESTINAL
Mushroom PEG in left upper abdomen. No signs of infection. Dressing was removed. Traction properly placed on tube and it was pulled. I placed pressure for 5 minutes, then placed compression dressing. He is to remain supine for 30 more minutes. Sight looks good. No pain or signs of bleeding.

## 2025-06-24 NOTE — HPI-TODAY'S VISIT:
70 year-old male presents for removal of peg tube (He has the original. Mushroom catheter tube) placed at [facility] 11/2024. He had a stroke in October and has been in a nursing home. He is on stretcher brought by [facility] and unable to move his left side. His wife did come for visit today. He did hold Eliquis and did not eat breakfast this morning. He and his wife state the tube has not been used in over a month. He states it bothers him, and he would like it removed.   Last OV 5/3/2022 for chronic cough.  Taking Omeprazole 20 mg daily.  Coughing sometimes occurs after meals and at different times during the day, but patient denied cough at nighttime.  On several pulmonary medications.  EGD with Bravo  2/2024 showed 1 cm hiatal hernia.  Bravo with normal DeMeester score of 5.7, and no correlation with cough and reflux. He has seen ENT in the past, and uses nasal spray.  Is also on daily inhaler.  Advised trial of daily Claritin and nightly nasal saline lavages.  Patient has history colon polyps  Patient has a history of stroke in 2024, 2018 and 2024, coronary artery disease, chronic dysphagia, and GERD.